# Patient Record
Sex: MALE | Race: WHITE | Employment: UNEMPLOYED | URBAN - METROPOLITAN AREA
[De-identification: names, ages, dates, MRNs, and addresses within clinical notes are randomized per-mention and may not be internally consistent; named-entity substitution may affect disease eponyms.]

---

## 2021-11-20 ENCOUNTER — APPOINTMENT (OUTPATIENT)
Dept: CT IMAGING | Age: 56
DRG: 390 | End: 2021-11-20
Payer: OTHER GOVERNMENT

## 2021-11-20 ENCOUNTER — APPOINTMENT (OUTPATIENT)
Dept: GENERAL RADIOLOGY | Age: 56
DRG: 390 | End: 2021-11-20
Payer: OTHER GOVERNMENT

## 2021-11-20 ENCOUNTER — HOSPITAL ENCOUNTER (INPATIENT)
Age: 56
LOS: 3 days | Discharge: HOME | DRG: 390 | End: 2021-11-23
Attending: EMERGENCY MEDICINE | Admitting: SURGERY
Payer: OTHER GOVERNMENT

## 2021-11-20 DIAGNOSIS — K56.609 SMALL BOWEL OBSTRUCTION (HCC): Primary | ICD-10-CM

## 2021-11-20 DIAGNOSIS — K59.00 CONSTIPATION, UNSPECIFIED CONSTIPATION TYPE: ICD-10-CM

## 2021-11-20 LAB
ALBUMIN SERPL-MCNC: 3.9 G/DL (ref 3.5–5.2)
ALP BLD-CCNC: 61 U/L (ref 40–129)
ALT SERPL-CCNC: 151 U/L (ref 0–40)
ANION GAP SERPL CALCULATED.3IONS-SCNC: 15 MMOL/L (ref 7–16)
AST SERPL-CCNC: 137 U/L (ref 0–39)
BACTERIA: ABNORMAL /HPF
BASOPHILS ABSOLUTE: 0.03 E9/L (ref 0–0.2)
BASOPHILS RELATIVE PERCENT: 0.4 % (ref 0–2)
BILIRUB SERPL-MCNC: 1.9 MG/DL (ref 0–1.2)
BILIRUBIN URINE: ABNORMAL
BLOOD, URINE: NEGATIVE
BUN BLDV-MCNC: 10 MG/DL (ref 6–20)
CALCIUM SERPL-MCNC: 9.7 MG/DL (ref 8.6–10.2)
CHLORIDE BLD-SCNC: 94 MMOL/L (ref 98–107)
CLARITY: CLEAR
CO2: 23 MMOL/L (ref 22–29)
COLOR: ABNORMAL
CREAT SERPL-MCNC: 0.6 MG/DL (ref 0.7–1.2)
EOSINOPHILS ABSOLUTE: 0.03 E9/L (ref 0.05–0.5)
EOSINOPHILS RELATIVE PERCENT: 0.4 % (ref 0–6)
EPITHELIAL CELLS, UA: ABNORMAL /HPF
GFR AFRICAN AMERICAN: >60
GFR NON-AFRICAN AMERICAN: >60 ML/MIN/1.73
GLUCOSE BLD-MCNC: 247 MG/DL (ref 74–99)
GLUCOSE URINE: 250 MG/DL
HCT VFR BLD CALC: 50.2 % (ref 37–54)
HEMOGLOBIN: 18.1 G/DL (ref 12.5–16.5)
HYALINE CASTS: ABNORMAL /LPF (ref 0–2)
IMMATURE GRANULOCYTES #: 0.05 E9/L
IMMATURE GRANULOCYTES %: 0.7 % (ref 0–5)
KETONES, URINE: 40 MG/DL
LACTIC ACID: 2.3 MMOL/L (ref 0.5–2.2)
LEUKOCYTE ESTERASE, URINE: NEGATIVE
LIPASE: 166 U/L (ref 13–60)
LYMPHOCYTES ABSOLUTE: 1.11 E9/L (ref 1.5–4)
LYMPHOCYTES RELATIVE PERCENT: 15.7 % (ref 20–42)
MCH RBC QN AUTO: 32.6 PG (ref 26–35)
MCHC RBC AUTO-ENTMCNC: 36.1 % (ref 32–34.5)
MCV RBC AUTO: 90.3 FL (ref 80–99.9)
METER GLUCOSE: 197 MG/DL (ref 74–99)
MONOCYTES ABSOLUTE: 0.75 E9/L (ref 0.1–0.95)
MONOCYTES RELATIVE PERCENT: 10.6 % (ref 2–12)
MUCUS: PRESENT /LPF
NEUTROPHILS ABSOLUTE: 5.08 E9/L (ref 1.8–7.3)
NEUTROPHILS RELATIVE PERCENT: 72.2 % (ref 43–80)
NITRITE, URINE: POSITIVE
PDW BLD-RTO: 11.8 FL (ref 11.5–15)
PH UA: 5.5 (ref 5–9)
PLATELET # BLD: 276 E9/L (ref 130–450)
PMV BLD AUTO: 10.4 FL (ref 7–12)
POTASSIUM SERPL-SCNC: 4.3 MMOL/L (ref 3.5–5)
POTASSIUM SERPL-SCNC: 6.6 MMOL/L (ref 3.5–5)
PROTEIN UA: 100 MG/DL
RBC # BLD: 5.56 E12/L (ref 3.8–5.8)
RBC UA: ABNORMAL /HPF (ref 0–2)
SODIUM BLD-SCNC: 132 MMOL/L (ref 132–146)
SPECIFIC GRAVITY UA: >=1.03 (ref 1–1.03)
TOTAL PROTEIN: 8.1 G/DL (ref 6.4–8.3)
UROBILINOGEN, URINE: 4 E.U./DL
WBC # BLD: 7.1 E9/L (ref 4.5–11.5)
WBC UA: ABNORMAL /HPF (ref 0–5)

## 2021-11-20 PROCEDURE — 96375 TX/PRO/DX INJ NEW DRUG ADDON: CPT

## 2021-11-20 PROCEDURE — 2580000003 HC RX 258: Performed by: SURGERY

## 2021-11-20 PROCEDURE — 84132 ASSAY OF SERUM POTASSIUM: CPT

## 2021-11-20 PROCEDURE — 2500000003 HC RX 250 WO HCPCS: Performed by: SURGERY

## 2021-11-20 PROCEDURE — 82962 GLUCOSE BLOOD TEST: CPT

## 2021-11-20 PROCEDURE — 96361 HYDRATE IV INFUSION ADD-ON: CPT

## 2021-11-20 PROCEDURE — 6370000000 HC RX 637 (ALT 250 FOR IP): Performed by: STUDENT IN AN ORGANIZED HEALTH CARE EDUCATION/TRAINING PROGRAM

## 2021-11-20 PROCEDURE — 6360000002 HC RX W HCPCS: Performed by: SURGERY

## 2021-11-20 PROCEDURE — 6360000004 HC RX CONTRAST MEDICATION: Performed by: RADIOLOGY

## 2021-11-20 PROCEDURE — 81001 URINALYSIS AUTO W/SCOPE: CPT

## 2021-11-20 PROCEDURE — 99283 EMERGENCY DEPT VISIT LOW MDM: CPT

## 2021-11-20 PROCEDURE — 6360000002 HC RX W HCPCS: Performed by: STUDENT IN AN ORGANIZED HEALTH CARE EDUCATION/TRAINING PROGRAM

## 2021-11-20 PROCEDURE — 80053 COMPREHEN METABOLIC PANEL: CPT

## 2021-11-20 PROCEDURE — 74018 RADEX ABDOMEN 1 VIEW: CPT

## 2021-11-20 PROCEDURE — 83690 ASSAY OF LIPASE: CPT

## 2021-11-20 PROCEDURE — 83605 ASSAY OF LACTIC ACID: CPT

## 2021-11-20 PROCEDURE — 6370000000 HC RX 637 (ALT 250 FOR IP): Performed by: SURGERY

## 2021-11-20 PROCEDURE — 96374 THER/PROPH/DIAG INJ IV PUSH: CPT

## 2021-11-20 PROCEDURE — 2580000003 HC RX 258: Performed by: STUDENT IN AN ORGANIZED HEALTH CARE EDUCATION/TRAINING PROGRAM

## 2021-11-20 PROCEDURE — 74177 CT ABD & PELVIS W/CONTRAST: CPT

## 2021-11-20 PROCEDURE — 1200000000 HC SEMI PRIVATE

## 2021-11-20 PROCEDURE — 6360000002 HC RX W HCPCS

## 2021-11-20 PROCEDURE — 85025 COMPLETE CBC W/AUTO DIFF WBC: CPT

## 2021-11-20 RX ORDER — MORPHINE SULFATE 2 MG/ML
INJECTION, SOLUTION INTRAMUSCULAR; INTRAVENOUS
Status: COMPLETED
Start: 2021-11-20 | End: 2021-11-20

## 2021-11-20 RX ORDER — MORPHINE SULFATE 4 MG/ML
4 INJECTION, SOLUTION INTRAMUSCULAR; INTRAVENOUS ONCE
Status: DISCONTINUED | OUTPATIENT
Start: 2021-11-20 | End: 2021-11-23 | Stop reason: HOSPADM

## 2021-11-20 RX ORDER — DEXTROSE MONOHYDRATE 50 MG/ML
100 INJECTION, SOLUTION INTRAVENOUS PRN
Status: DISCONTINUED | OUTPATIENT
Start: 2021-11-20 | End: 2021-11-23 | Stop reason: HOSPADM

## 2021-11-20 RX ORDER — LISINOPRIL AND HYDROCHLOROTHIAZIDE 12.5; 1 MG/1; MG/1
1 TABLET ORAL DAILY
COMMUNITY

## 2021-11-20 RX ORDER — LOVASTATIN 20 MG/1
20 TABLET ORAL NIGHTLY
COMMUNITY

## 2021-11-20 RX ORDER — GLIPIZIDE 10 MG/1
10 TABLET ORAL DAILY
COMMUNITY

## 2021-11-20 RX ORDER — NICOTINE POLACRILEX 4 MG
15 LOZENGE BUCCAL PRN
Status: DISCONTINUED | OUTPATIENT
Start: 2021-11-20 | End: 2021-11-23 | Stop reason: HOSPADM

## 2021-11-20 RX ORDER — 0.9 % SODIUM CHLORIDE 0.9 %
1000 INTRAVENOUS SOLUTION INTRAVENOUS ONCE
Status: COMPLETED | OUTPATIENT
Start: 2021-11-20 | End: 2021-11-20

## 2021-11-20 RX ORDER — SODIUM CHLORIDE, SODIUM LACTATE, POTASSIUM CHLORIDE, CALCIUM CHLORIDE 600; 310; 30; 20 MG/100ML; MG/100ML; MG/100ML; MG/100ML
INJECTION, SOLUTION INTRAVENOUS CONTINUOUS
Status: DISCONTINUED | OUTPATIENT
Start: 2021-11-20 | End: 2021-11-23

## 2021-11-20 RX ORDER — DEXTROSE MONOHYDRATE 25 G/50ML
12.5 INJECTION, SOLUTION INTRAVENOUS PRN
Status: DISCONTINUED | OUTPATIENT
Start: 2021-11-20 | End: 2021-11-23 | Stop reason: HOSPADM

## 2021-11-20 RX ORDER — SODIUM CHLORIDE 9 MG/ML
25 INJECTION, SOLUTION INTRAVENOUS PRN
Status: DISCONTINUED | OUTPATIENT
Start: 2021-11-20 | End: 2021-11-23 | Stop reason: HOSPADM

## 2021-11-20 RX ORDER — ONDANSETRON 2 MG/ML
4 INJECTION INTRAMUSCULAR; INTRAVENOUS ONCE
Status: COMPLETED | OUTPATIENT
Start: 2021-11-20 | End: 2021-11-20

## 2021-11-20 RX ORDER — LIDOCAINE HYDROCHLORIDE 20 MG/ML
JELLY TOPICAL ONCE
Status: COMPLETED | OUTPATIENT
Start: 2021-11-20 | End: 2021-11-20

## 2021-11-20 RX ORDER — ONDANSETRON 2 MG/ML
4 INJECTION INTRAMUSCULAR; INTRAVENOUS EVERY 6 HOURS PRN
Status: DISCONTINUED | OUTPATIENT
Start: 2021-11-20 | End: 2021-11-23 | Stop reason: HOSPADM

## 2021-11-20 RX ORDER — SODIUM CHLORIDE 0.9 % (FLUSH) 0.9 %
5-40 SYRINGE (ML) INJECTION EVERY 12 HOURS SCHEDULED
Status: DISCONTINUED | OUTPATIENT
Start: 2021-11-20 | End: 2021-11-23 | Stop reason: HOSPADM

## 2021-11-20 RX ORDER — SODIUM CHLORIDE 0.9 % (FLUSH) 0.9 %
5-40 SYRINGE (ML) INJECTION PRN
Status: DISCONTINUED | OUTPATIENT
Start: 2021-11-20 | End: 2021-11-23 | Stop reason: HOSPADM

## 2021-11-20 RX ORDER — ASPIRIN 81 MG/1
81 TABLET, CHEWABLE ORAL DAILY
COMMUNITY

## 2021-11-20 RX ORDER — OXYMETAZOLINE HYDROCHLORIDE 0.05 G/100ML
2 SPRAY NASAL ONCE
Status: DISCONTINUED | OUTPATIENT
Start: 2021-11-20 | End: 2021-11-23 | Stop reason: HOSPADM

## 2021-11-20 RX ORDER — CALCIUM GLUCONATE 94 MG/ML
1000 INJECTION, SOLUTION INTRAVENOUS ONCE
Status: COMPLETED | OUTPATIENT
Start: 2021-11-20 | End: 2021-11-20

## 2021-11-20 RX ADMIN — CALCIUM GLUCONATE 1000 MG: 98 INJECTION, SOLUTION INTRAVENOUS at 16:08

## 2021-11-20 RX ADMIN — SODIUM CHLORIDE 1000 ML: 9 INJECTION, SOLUTION INTRAVENOUS at 17:00

## 2021-11-20 RX ADMIN — SODIUM CHLORIDE 1000 ML: 9 INJECTION, SOLUTION INTRAVENOUS at 15:35

## 2021-11-20 RX ADMIN — ONDANSETRON 4 MG: 2 INJECTION INTRAMUSCULAR; INTRAVENOUS at 15:35

## 2021-11-20 RX ADMIN — INSULIN LISPRO 1 UNITS: 100 INJECTION, SOLUTION INTRAVENOUS; SUBCUTANEOUS at 21:40

## 2021-11-20 RX ADMIN — SODIUM CHLORIDE, POTASSIUM CHLORIDE, SODIUM LACTATE AND CALCIUM CHLORIDE: 600; 310; 30; 20 INJECTION, SOLUTION INTRAVENOUS at 21:17

## 2021-11-20 RX ADMIN — HYDROMORPHONE HYDROCHLORIDE 0.5 MG: 1 INJECTION, SOLUTION INTRAMUSCULAR; INTRAVENOUS; SUBCUTANEOUS at 21:44

## 2021-11-20 RX ADMIN — FAMOTIDINE 20 MG: 10 INJECTION, SOLUTION INTRAVENOUS at 21:25

## 2021-11-20 RX ADMIN — MORPHINE SULFATE 4 MG: 2 INJECTION, SOLUTION INTRAMUSCULAR; INTRAVENOUS at 15:35

## 2021-11-20 RX ADMIN — HYDROMORPHONE HYDROCHLORIDE 0.5 MG: 1 INJECTION, SOLUTION INTRAMUSCULAR; INTRAVENOUS; SUBCUTANEOUS at 18:48

## 2021-11-20 RX ADMIN — SODIUM CHLORIDE, PRESERVATIVE FREE 10 ML: 5 INJECTION INTRAVENOUS at 21:30

## 2021-11-20 RX ADMIN — IOPAMIDOL 75 ML: 755 INJECTION, SOLUTION INTRAVENOUS at 17:45

## 2021-11-20 RX ADMIN — LIDOCAINE HYDROCHLORIDE: 20 JELLY TOPICAL at 18:49

## 2021-11-20 ASSESSMENT — PAIN DESCRIPTION - FREQUENCY: FREQUENCY: CONTINUOUS

## 2021-11-20 ASSESSMENT — PAIN - FUNCTIONAL ASSESSMENT: PAIN_FUNCTIONAL_ASSESSMENT: PREVENTS OR INTERFERES SOME ACTIVE ACTIVITIES AND ADLS

## 2021-11-20 ASSESSMENT — PAIN DESCRIPTION - ORIENTATION
ORIENTATION: MID
ORIENTATION: MID

## 2021-11-20 ASSESSMENT — PAIN DESCRIPTION - DESCRIPTORS: DESCRIPTORS: SHARP

## 2021-11-20 ASSESSMENT — ENCOUNTER SYMPTOMS
VOMITING: 1
EYE DISCHARGE: 0
CONSTIPATION: 1
BACK PAIN: 0
ABDOMINAL PAIN: 1
EYE PAIN: 0
NAUSEA: 1
DIARRHEA: 0
SINUS PRESSURE: 0
EYE REDNESS: 0
COUGH: 0
SHORTNESS OF BREATH: 0
WHEEZING: 0
SORE THROAT: 0

## 2021-11-20 ASSESSMENT — PAIN DESCRIPTION - ONSET: ONSET: SUDDEN

## 2021-11-20 ASSESSMENT — PAIN DESCRIPTION - LOCATION
LOCATION: ABDOMEN
LOCATION: ABDOMEN

## 2021-11-20 ASSESSMENT — PAIN SCALES - GENERAL
PAINLEVEL_OUTOF10: 7
PAINLEVEL_OUTOF10: 8
PAINLEVEL_OUTOF10: 9
PAINLEVEL_OUTOF10: 10
PAINLEVEL_OUTOF10: 5

## 2021-11-20 ASSESSMENT — PAIN DESCRIPTION - PAIN TYPE
TYPE: ACUTE PAIN
TYPE: ACUTE PAIN

## 2021-11-20 NOTE — ED PROVIDER NOTES
Chief Complaint   Patient presents with    Abdominal Pain     mid onset yesterday, reports hx of multiple hernia repairs in that location Pain radiates straight thru to back    Constipation     last normal BM 2 days, able to pass gas       Patient is a 79-year-old male presents today for generalized abdominal pain and constipation. Has not had a bowel movement past 2 days. Does have history of multiple abdominal surgeries due to abdominal hernias with mesh placed. [place]. Has associated nausea with an episode of vomiting today. Denies fevers or chills. Denies lightheadedness or dizziness. Is not tried a medication for the symptoms. Symptoms not made better or worse by anything in specific. Denies urinary symptoms. Denies fevers or chills. Pain is generalized to the abdomen, not worse in 1 specific spot. The history is provided by the patient. No  was used. Review of Systems   Constitutional: Negative for chills, diaphoresis and fever. HENT: Negative for ear pain, sinus pressure and sore throat. Eyes: Negative for pain, discharge and redness. Respiratory: Negative for cough, shortness of breath and wheezing. Cardiovascular: Negative for chest pain. Gastrointestinal: Positive for abdominal pain, constipation, nausea and vomiting. Negative for diarrhea. Genitourinary: Negative for difficulty urinating, dysuria, flank pain and frequency. Musculoskeletal: Negative for arthralgias and back pain. Skin: Negative for rash and wound. Neurological: Negative for dizziness, syncope, weakness, numbness and headaches. Hematological: Negative for adenopathy. Psychiatric/Behavioral: Negative for behavioral problems and confusion. All other systems reviewed and are negative. Physical Exam  Vitals and nursing note reviewed. Constitutional:       General: He is not in acute distress. Appearance: He is well-developed.  He is obese. He is not ill-appearing. HENT:      Head: Normocephalic and atraumatic. Eyes:      Pupils: Pupils are equal, round, and reactive to light. Cardiovascular:      Rate and Rhythm: Normal rate and regular rhythm. Pulses: Normal pulses. Heart sounds: Normal heart sounds. No murmur heard. Pulmonary:      Effort: Pulmonary effort is normal. No respiratory distress. Breath sounds: Normal breath sounds. No wheezing or rales. Abdominal:      General: Bowel sounds are normal.      Palpations: Abdomen is soft. Tenderness: There is abdominal tenderness. There is no guarding or rebound. Comments: Generalized tenderness throughout, no guarding rebound  Multiple abdominal scars from previous surgeries   Musculoskeletal:      Cervical back: Normal range of motion and neck supple. Right lower leg: No edema. Left lower leg: No edema. Skin:     General: Skin is warm and dry. Neurological:      General: No focal deficit present. Mental Status: He is alert and oriented to person, place, and time. Cranial Nerves: No cranial nerve deficit.       Coordination: Coordination normal.          Procedures     Labs Reviewed   CBC WITH AUTO DIFFERENTIAL - Abnormal; Notable for the following components:       Result Value    Hemoglobin 18.1 (*)     MCHC 36.1 (*)     Lymphocytes % 15.7 (*)     Lymphocytes Absolute 1.11 (*)     Eosinophils Absolute 0.03 (*)     All other components within normal limits   COMPREHENSIVE METABOLIC PANEL - Abnormal; Notable for the following components:    Potassium 6.6 (*)     Chloride 94 (*)     Glucose 247 (*)     CREATININE 0.6 (*)     Total Bilirubin 1.9 (*)      (*)      (*)     All other components within normal limits    Narrative:     CALL  [facility] tel. [phone],  Chemistry results called to and read back by/agus knight rn, 11/20/2021  15:57, by TACOS   LIPASE - Abnormal; Notable for the following components:    Lipase 166 (*)     All other components within normal limits    Narrative:     [facility] tel. [phone],  Chemistry results called to and read back by/agus knight rn, 11/20/2021  15:57, by TACOS   LACTIC ACID, PLASMA - Abnormal; Notable for the following components:    Lactic Acid 2.3 (*)     All other components within normal limits   URINALYSIS WITH MICROSCOPIC - Abnormal; Notable for the following components:    Color, UA DARK YELLOW (*)     Glucose, Ur 250 (*)     Bilirubin Urine MODERATE (*)     Ketones, Urine 40 (*)     Protein,  (*)     Urobilinogen, Urine 4.0 (*)     Nitrite, Urine POSITIVE (*)     Mucus, UA Present (*)     WBC, UA 5-10 (*)     Bacteria, UA FEW (*)     All other components within normal limits   POTASSIUM   BASIC METABOLIC PANEL W/ REFLEX TO MG FOR LOW K   HEPATIC FUNCTION PANEL   LACTIC ACID, PLASMA   MAGNESIUM   PHOSPHORUS   LIPASE   CBC WITH AUTO DIFFERENTIAL   PROTIME-INR   HEMOGLOBIN A1C   POCT GLUCOSE   POCT GLUCOSE     CT ABDOMEN PELVIS W IV CONTRAST Additional Contrast? None   Final Result   There are mildly dilated and fluid-filled loops of proximal and mid small   bowel with multiple air-fluid levels, suspicious for early or partial small   bowel obstruction. Diffuse fatty liver. XR ABDOMEN FOR NG/OG/NE TUBE PLACEMENT    (Results Pending)         MDM  Number of Diagnoses or Management Options  Diagnosis management comments: Patient is a 57-year-old male with many abdominal surgeries due to hernia with mesh who presents today for abdominal pain, nausea, vomiting constipation. There was concern for bowel obstruction, labs and imaging were obtained. He was given pain medication. CT imaging shows dilated and fluid-filled loops of proximal and mid small bowel with multiple air-fluid levels consistent with small bowel obstruction.   Initial potassium was hemolyzed, elevated at 6.6, at that time he was given calcium gluconate, repeat potassium does show improvement of 4.4.  Lactic mildly elevated at 2.3. Remainder of labs within normal limits otherwise. General surgery was consulted, they recommended n.p.o. and NG tube to be placed. Patient will be admitted to the hospital for bowel obstruction. He is remained hemodynamically stable. Amount and/or Complexity of Data Reviewed  Clinical lab tests: reviewed  Tests in the radiology section of CPT®: reviewed             ED Course as of 11/20/21 1925   Sat Nov 20, 2021   1559 Potassium 6.6, moderately hemolyzed. 1 g calcium ordered, repeat potassium ordered, patient will be placed on cardiac monitor. Nurse was updated []   1650 Repeat potassium 4.3, initial of 6.6 was hemolyzed. []   36 Spoke with general surgery who will admit []      ED Course User Index  [] Tori Tena, DO       --------------------------------------------- PAST HISTORY ---------------------------------------------  Past Medical History:  has a past medical history of Controlled type 2 diabetes mellitus without complication, without long-term current use of insulin ([facility] [address] and Primary hypertension. Past Surgical History:  has no past surgical history on file. Social History:  reports that he has never smoked. He has never used smokeless tobacco. He reports previous alcohol use. He reports previous drug use. Family History: family history is not on file. The patients home medications have been reviewed. Allergies: Patient has no known allergies.     -------------------------------------------------- RESULTS -------------------------------------------------    LABS:  Results for orders placed or performed during the hospital encounter of 11/20/21   CBC auto differential   Result Value Ref Range    WBC 7.1 4.5 - 11.5 E9/L    RBC 5.56 3.80 - 5.80 E12/L    Hemoglobin 18.1 (H) 12.5 - 16.5 g/dL    Hematocrit 50.2 37.0 - 54.0 %    MCV 90.3 80.0 - 99.9 fL    MCH 32.6 26.0 - 35.0 pg    MCHC 36.1 (H) 32.0 - 34.5 %    RDW 11.8 11.5 - 15.0 fL Platelets 965 044 - 501 E9/L    MPV 10.4 7.0 - 12.0 fL    Neutrophils % 72.2 43.0 - 80.0 %    Immature Granulocytes % 0.7 0.0 - 5.0 %    Lymphocytes % 15.7 (L) 20.0 - 42.0 %    Monocytes % 10.6 2.0 - 12.0 %    Eosinophils % 0.4 0.0 - 6.0 %    Basophils % 0.4 0.0 - 2.0 %    Neutrophils Absolute 5.08 1.80 - 7.30 E9/L    Immature Granulocytes # 0.05 E9/L    Lymphocytes Absolute 1.11 (L) 1.50 - 4.00 E9/L    Monocytes Absolute 0.75 0.10 - 0.95 E9/L    Eosinophils Absolute 0.03 (L) 0.05 - 0.50 E9/L    Basophils Absolute 0.03 0.00 - 0.20 E9/L   Comprehensive Metabolic Panel   Result Value Ref Range    Sodium 132 132 - 146 mmol/L    Potassium 6.6 (HH) 3.5 - 5.0 mmol/L    Chloride 94 (L) 98 - 107 mmol/L    CO2 23 22 - 29 mmol/L    Anion Gap 15 7 - 16 mmol/L    Glucose 247 (H) 74 - 99 mg/dL    BUN 10 6 - 20 mg/dL    CREATININE 0.6 (L) 0.7 - 1.2 mg/dL    GFR Non-African American >60 >=60 mL/min/1.73    GFR African American >60     Calcium 9.7 8.6 - 10.2 mg/dL    Total Protein 8.1 6.4 - 8.3 g/dL    Albumin 3.9 3.5 - 5.2 g/dL    Total Bilirubin 1.9 (H) 0.0 - 1.2 mg/dL    Alkaline Phosphatase 61 40 - 129 U/L     (H) 0 - 40 U/L     (H) 0 - 39 U/L   Lipase   Result Value Ref Range    Lipase 166 (H) 13 - 60 U/L   Lactic Acid, Plasma   Result Value Ref Range    Lactic Acid 2.3 (H) 0.5 - 2.2 mmol/L   Urinalysis with Microscopic   Result Value Ref Range    Color, UA DARK YELLOW (A) Straw/Yellow    Clarity, UA Clear Clear    Glucose, Ur 250 (A) Negative mg/dL    Bilirubin Urine MODERATE (A) Negative    Ketones, Urine 40 (A) Negative mg/dL    Specific Gravity, UA >=1.030 1.005 - 1.030    Blood, Urine Negative Negative    pH, UA 5.5 5.0 - 9.0    Protein,  (A) Negative mg/dL    Urobilinogen, Urine 4.0 (A) <2.0 E.U./dL    Nitrite, Urine POSITIVE (A) Negative    Leukocyte Esterase, Urine Negative Negative    Hyaline Casts, UA 0-2 0 - 2 /LPF    Mucus, UA Present (A) None Seen /LPF    WBC, UA 5-10 (A) 0 - 5 /HPF    RBC, UA 2-5 0 - 2 /HPF    Epithelial Cells, UA FEW /HPF    Bacteria, UA FEW (A) None Seen /HPF   Potassium   Result Value Ref Range    Potassium 4.3 3.5 - 5.0 mmol/L       RADIOLOGY:  CT ABDOMEN PELVIS W IV CONTRAST Additional Contrast? None   Final Result   There are mildly dilated and fluid-filled loops of proximal and mid small   bowel with multiple air-fluid levels, suspicious for early or partial small   bowel obstruction. Diffuse fatty liver. XR ABDOMEN FOR NG/OG/NE TUBE PLACEMENT    (Results Pending)           ------------------------- NURSING NOTES AND VITALS REVIEWED ---------------------------  Date / Time Roomed:  11/20/2021  1:50 PM  ED Bed Assignment:  18/18    The nursing notes within the ED encounter and vital signs as below have been reviewed. Patient Vitals for the past 24 hrs:   BP Temp Temp src Pulse Resp SpO2 Height Weight   11/20/21 1906 (!) 128/94 -- -- 94 16 96 % -- --   11/20/21 1347 (!) 136/100 97.8 °F (36.6 °C) Temporal 104 16 94 % 6' (1.829 m) 248 lb (112.5 kg)       Oxygen Saturation Interpretation: Normal    ------------------------------------------ PROGRESS NOTES ------------------------------------------    Counseling:  I have spoken with the patient and discussed todays results, in addition to providing specific details for the plan of care and counseling regarding the diagnosis and prognosis. Their questions are answered at this time and they are agreeable with the plan of admission.    --------------------------------- ADDITIONAL PROVIDER NOTES ---------------------------------  Consultations:  Spoke with Dr. Catrachito Salas. Discussed case. They will admit the patient. This patient's ED course included: a personal history and physicial examination    This patient has remained hemodynamically stable during their ED course.       Medications   morphine sulfate (PF) injection 4 mg (4 mg IntraVENous Not Given 11/20/21 1543)   oxymetazoline (AFRIN) 0.05 % nasal spray 2 spray (has no administration in time range)   sodium chloride flush 0.9 % injection 5-40 mL (has no administration in time range)   sodium chloride flush 0.9 % injection 5-40 mL (has no administration in time range)   0.9 % sodium chloride infusion (has no administration in time range)   enoxaparin (LOVENOX) injection 40 mg (has no administration in time range)   lactated ringers infusion (has no administration in time range)   HYDROmorphone (DILAUDID) injection 0.25 mg (has no administration in time range)     Or   HYDROmorphone (DILAUDID) injection 0.5 mg (has no administration in time range)   famotidine (PEPCID) injection 20 mg (has no administration in time range)   ondansetron (ZOFRAN) injection 4 mg (has no administration in time range)   glucose (GLUTOSE) 40 % oral gel 15 g (has no administration in time range)   dextrose 50 % IV solution (has no administration in time range)   glucagon (rDNA) injection 1 mg (has no administration in time range)   dextrose 5 % solution (has no administration in time range)   insulin lispro (HUMALOG) injection vial 0-6 Units (has no administration in time range)   ondansetron (ZOFRAN) injection 4 mg (4 mg IntraVENous Given 11/20/21 1535)   0.9 % sodium chloride bolus (0 mLs IntraVENous Stopped 11/20/21 1908)   morphine 2 MG/ML injection (4 mg  Given 11/20/21 1535)   calcium gluconate 10 % injection 1,000 mg (1,000 mg IntraVENous Given 11/20/21 1608)   0.9 % sodium chloride bolus (0 mLs IntraVENous Stopped 11/20/21 1908)   iopamidol (ISOVUE-370) 76 % injection 75 mL (75 mLs IntraVENous Given 11/20/21 1745)   HYDROmorphone (DILAUDID) injection 0.5 mg (0.5 mg IntraVENous Given 11/20/21 1848)   lidocaine (XYLOCAINE) 2 % jelly ( Topical Given 11/20/21 1849)         Diagnosis:  1. Small bowel obstruction ([address]    2. Constipation, unspecified constipation type        Disposition:  Patient's disposition: Admit to med/surg floor  Patient's condition is stable.              Sherry Hicks DO  Resident  11/20/21 1925

## 2021-11-21 LAB
ALBUMIN SERPL-MCNC: 3.7 G/DL (ref 3.5–5.2)
ALP BLD-CCNC: 59 U/L (ref 40–129)
ALT SERPL-CCNC: 120 U/L (ref 0–40)
ANION GAP SERPL CALCULATED.3IONS-SCNC: 12 MMOL/L (ref 7–16)
AST SERPL-CCNC: 85 U/L (ref 0–39)
BASOPHILS ABSOLUTE: 0.03 E9/L (ref 0–0.2)
BASOPHILS RELATIVE PERCENT: 0.5 % (ref 0–2)
BILIRUB SERPL-MCNC: 1.5 MG/DL (ref 0–1.2)
BILIRUBIN DIRECT: 0.7 MG/DL (ref 0–0.3)
BILIRUBIN, INDIRECT: 0.8 MG/DL (ref 0–1)
BUN BLDV-MCNC: 10 MG/DL (ref 6–20)
CALCIUM SERPL-MCNC: 9 MG/DL (ref 8.6–10.2)
CHLORIDE BLD-SCNC: 99 MMOL/L (ref 98–107)
CO2: 26 MMOL/L (ref 22–29)
CREAT SERPL-MCNC: 0.7 MG/DL (ref 0.7–1.2)
EOSINOPHILS ABSOLUTE: 0.07 E9/L (ref 0.05–0.5)
EOSINOPHILS RELATIVE PERCENT: 1.1 % (ref 0–6)
GFR AFRICAN AMERICAN: >60
GFR NON-AFRICAN AMERICAN: >60 ML/MIN/1.73
GLUCOSE BLD-MCNC: 233 MG/DL (ref 74–99)
HBA1C MFR BLD: 11 % (ref 4–5.6)
HCT VFR BLD CALC: 47.5 % (ref 37–54)
HEMOGLOBIN: 16.4 G/DL (ref 12.5–16.5)
IMMATURE GRANULOCYTES #: 0.04 E9/L
IMMATURE GRANULOCYTES %: 0.6 % (ref 0–5)
INR BLD: 1
LACTIC ACID: 1.4 MMOL/L (ref 0.5–2.2)
LIPASE: 85 U/L (ref 13–60)
LYMPHOCYTES ABSOLUTE: 1.13 E9/L (ref 1.5–4)
LYMPHOCYTES RELATIVE PERCENT: 17.3 % (ref 20–42)
MAGNESIUM: 2.2 MG/DL (ref 1.6–2.6)
MCH RBC QN AUTO: 31.7 PG (ref 26–35)
MCHC RBC AUTO-ENTMCNC: 34.5 % (ref 32–34.5)
MCV RBC AUTO: 91.9 FL (ref 80–99.9)
METER GLUCOSE: 130 MG/DL (ref 74–99)
METER GLUCOSE: 149 MG/DL (ref 74–99)
METER GLUCOSE: 185 MG/DL (ref 74–99)
METER GLUCOSE: 189 MG/DL (ref 74–99)
METER GLUCOSE: 192 MG/DL (ref 74–99)
METER GLUCOSE: 198 MG/DL (ref 74–99)
MONOCYTES ABSOLUTE: 0.64 E9/L (ref 0.1–0.95)
MONOCYTES RELATIVE PERCENT: 9.8 % (ref 2–12)
NEUTROPHILS ABSOLUTE: 4.64 E9/L (ref 1.8–7.3)
NEUTROPHILS RELATIVE PERCENT: 70.7 % (ref 43–80)
PDW BLD-RTO: 11.9 FL (ref 11.5–15)
PHOSPHORUS: 3.1 MG/DL (ref 2.5–4.5)
PLATELET # BLD: 281 E9/L (ref 130–450)
PMV BLD AUTO: 10.3 FL (ref 7–12)
POTASSIUM REFLEX MAGNESIUM: 4.5 MMOL/L (ref 3.5–5)
PROTHROMBIN TIME: 11.1 SEC (ref 9.3–12.4)
RBC # BLD: 5.17 E12/L (ref 3.8–5.8)
REASON FOR REJECTION: NORMAL
REJECTED TEST: NORMAL
SODIUM BLD-SCNC: 137 MMOL/L (ref 132–146)
TOTAL PROTEIN: 7.1 G/DL (ref 6.4–8.3)
WBC # BLD: 6.6 E9/L (ref 4.5–11.5)

## 2021-11-21 PROCEDURE — 2500000003 HC RX 250 WO HCPCS: Performed by: SURGERY

## 2021-11-21 PROCEDURE — 83605 ASSAY OF LACTIC ACID: CPT

## 2021-11-21 PROCEDURE — 6370000000 HC RX 637 (ALT 250 FOR IP): Performed by: SURGERY

## 2021-11-21 PROCEDURE — 36415 COLL VENOUS BLD VENIPUNCTURE: CPT

## 2021-11-21 PROCEDURE — 84100 ASSAY OF PHOSPHORUS: CPT

## 2021-11-21 PROCEDURE — 80048 BASIC METABOLIC PNL TOTAL CA: CPT

## 2021-11-21 PROCEDURE — 85610 PROTHROMBIN TIME: CPT

## 2021-11-21 PROCEDURE — 99222 1ST HOSP IP/OBS MODERATE 55: CPT | Performed by: SURGERY

## 2021-11-21 PROCEDURE — 85025 COMPLETE CBC W/AUTO DIFF WBC: CPT

## 2021-11-21 PROCEDURE — 83735 ASSAY OF MAGNESIUM: CPT

## 2021-11-21 PROCEDURE — 82962 GLUCOSE BLOOD TEST: CPT

## 2021-11-21 PROCEDURE — 83036 HEMOGLOBIN GLYCOSYLATED A1C: CPT

## 2021-11-21 PROCEDURE — 6360000002 HC RX W HCPCS: Performed by: SURGERY

## 2021-11-21 PROCEDURE — 1200000000 HC SEMI PRIVATE

## 2021-11-21 PROCEDURE — 83690 ASSAY OF LIPASE: CPT

## 2021-11-21 PROCEDURE — 2580000003 HC RX 258: Performed by: SURGERY

## 2021-11-21 PROCEDURE — 80076 HEPATIC FUNCTION PANEL: CPT

## 2021-11-21 RX ORDER — HYDRALAZINE HYDROCHLORIDE 20 MG/ML
10 INJECTION INTRAMUSCULAR; INTRAVENOUS EVERY 6 HOURS PRN
Status: DISCONTINUED | OUTPATIENT
Start: 2021-11-21 | End: 2021-11-23 | Stop reason: HOSPADM

## 2021-11-21 RX ADMIN — INSULIN LISPRO 1 UNITS: 100 INJECTION, SOLUTION INTRAVENOUS; SUBCUTANEOUS at 15:08

## 2021-11-21 RX ADMIN — BENZOCAINE, BUTAMBEN, AND TETRACAINE HYDROCHLORIDE 1 SPRAY: .028; .004; .004 AEROSOL, SPRAY TOPICAL at 19:35

## 2021-11-21 RX ADMIN — SODIUM CHLORIDE, POTASSIUM CHLORIDE, SODIUM LACTATE AND CALCIUM CHLORIDE: 600; 310; 30; 20 INJECTION, SOLUTION INTRAVENOUS at 12:04

## 2021-11-21 RX ADMIN — INSULIN LISPRO 1 UNITS: 100 INJECTION, SOLUTION INTRAVENOUS; SUBCUTANEOUS at 03:39

## 2021-11-21 RX ADMIN — FAMOTIDINE 20 MG: 10 INJECTION, SOLUTION INTRAVENOUS at 20:28

## 2021-11-21 RX ADMIN — SODIUM CHLORIDE, POTASSIUM CHLORIDE, SODIUM LACTATE AND CALCIUM CHLORIDE: 600; 310; 30; 20 INJECTION, SOLUTION INTRAVENOUS at 19:36

## 2021-11-21 RX ADMIN — INSULIN LISPRO 1 UNITS: 100 INJECTION, SOLUTION INTRAVENOUS; SUBCUTANEOUS at 06:09

## 2021-11-21 RX ADMIN — HYDROMORPHONE HYDROCHLORIDE 0.5 MG: 1 INJECTION, SOLUTION INTRAMUSCULAR; INTRAVENOUS; SUBCUTANEOUS at 00:51

## 2021-11-21 RX ADMIN — INSULIN LISPRO 1 UNITS: 100 INJECTION, SOLUTION INTRAVENOUS; SUBCUTANEOUS at 11:44

## 2021-11-21 RX ADMIN — SODIUM CHLORIDE, POTASSIUM CHLORIDE, SODIUM LACTATE AND CALCIUM CHLORIDE: 600; 310; 30; 20 INJECTION, SOLUTION INTRAVENOUS at 04:06

## 2021-11-21 RX ADMIN — HYDROMORPHONE HYDROCHLORIDE 0.5 MG: 1 INJECTION, SOLUTION INTRAMUSCULAR; INTRAVENOUS; SUBCUTANEOUS at 04:00

## 2021-11-21 RX ADMIN — FAMOTIDINE 20 MG: 10 INJECTION, SOLUTION INTRAVENOUS at 08:47

## 2021-11-21 RX ADMIN — INSULIN LISPRO 1 UNITS: 100 INJECTION, SOLUTION INTRAVENOUS; SUBCUTANEOUS at 19:12

## 2021-11-21 ASSESSMENT — PAIN SCALES - GENERAL
PAINLEVEL_OUTOF10: 6
PAINLEVEL_OUTOF10: 9
PAINLEVEL_OUTOF10: 3
PAINLEVEL_OUTOF10: 0

## 2021-11-21 NOTE — PLAN OF CARE
Problem: Pain:  Goal: Pain level will decrease  Description: Pain level will decrease  11/21/2021 1227 by Aung Holliday RN  Outcome: Met This Shift  11/20/2021 2337 by Joseph Castro RN  Outcome: Met This Shift  Goal: Control of acute pain  Description: Control of acute pain  11/21/2021 1227 by Aung Holliday RN  Outcome: Met This Shift  11/20/2021 2337 by Joseph Castro RN  Outcome: Met This Shift  Goal: Control of chronic pain  Description: Control of chronic pain  11/21/2021 1227 by Aung Holliday RN  Outcome: Met This Shift  11/20/2021 2337 by Joseph Castro RN  Outcome: Met This Shift

## 2021-11-21 NOTE — ED NOTES
Bed: 18  Expected date:   Expected time:   Means of arrival:   Comments:  [address], RN  11/20/21 1901

## 2021-11-21 NOTE — PROGRESS NOTES
Patient did 2 laps around floor. Tolerated well.   Electronically signed by Lakhwinder Romano RN on 11/21/2021 at 1:23 PM

## 2021-11-21 NOTE — PROGRESS NOTES
Patient refused blood glucose test and insulin.      Electronically signed by Osiel Puri RN on 11/20/2021 at 10:48 PM

## 2021-11-21 NOTE — H&P
Surgery History and Physical/Consult Note    CC:    Obstruction    HPI:  This is a 61 y.o. male with PMH below admitted 11/20/2021 with 2 days of decreased bowel movement and some abdominal pain. He has had his gallbladder out as well as multiple abdominal hernias with mesh in the past.  He has had bowel infections previously. He says he never had an NG tube however they had done what sounds to be follow-through's and they resolved spontaneously. All of his care previously was in [place]. He recently moved here 8 months ago. His pain is improved today. Passing small amount of flatus. Is on the bowel function. NG tube is in place and draining bilious contents. Did have some emesis prior to arrival and some nausea. Those are both better with NG tube decompression. PMH:  Past Medical History:   Diagnosis Date    Controlled type 2 diabetes mellitus without complication, without long-term current use of insulin (HCC)     Primary hypertension        PSH:  Past Surgical History:   Procedure Laterality Date    ABDOMINAL HERNIA REPAIR      With mesh    CHOLECYSTECTOMY         Family History:  No family history on file. Social History:   reports that he has never smoked. He has never used smokeless tobacco. He reports previous alcohol use. He reports previous drug use. Review of Systems:  A 14pt complete review of systems was performed, and all pertinent positives and negatives are listed in the HPI. All other systems are negative. Allergies:  No Known Allergies    Medications:  Home medications:   Prior to Admission medications    Medication Sig Start Date End Date Taking?  Authorizing Provider   glipiZIDE (GLUCOTROL) 10 MG tablet Take 10 mg by mouth daily   Yes Historical Provider, MD   aspirin 81 MG chewable tablet Take 81 mg by mouth daily   Yes Historical Provider, MD   lovastatin (MEVACOR) 20 MG tablet Take 20 mg by mouth nightly   Yes Historical Provider, MD   metFORMIN (GLUCOPHAGE) 1000 MG tablet Take 1,000 mg by mouth 2 times daily (with meals)   Yes Historical Provider, MD   lisinopril-hydroCHLOROthiazide (PRINZIDE;ZESTORETIC) 10-12.5 MG per tablet Take 1 tablet by mouth daily   Yes Historical Provider, MD       Scheduled medications:   morphine  4 mg IntraVENous Once    oxymetazoline  2 spray Each Nostril Once    sodium chloride flush  5-40 mL IntraVENous 2 times per day    enoxaparin  40 mg SubCUTAneous Daily    famotidine (PEPCID) injection  20 mg IntraVENous BID    insulin lispro  0-6 Units SubCUTAneous Q4H       PRN medications: sodium chloride flush, sodium chloride, HYDROmorphone **OR** HYDROmorphone, ondansetron, glucose, dextrose, glucagon (rDNA), dextrose    Objective:    Physical Examination:  Vitals:    11/20/21 1906 11/20/21 2045 11/21/21 0044 11/21/21 0846   BP: (!) 128/94 (!) 142/82  128/77   Pulse: 94 96  74   Resp: 16 16  16   Temp:  97.8 °F (36.6 °C)  97.6 °F (36.4 °C)   TempSrc:  Oral  Oral   SpO2: 96% 90%  94%   Weight:   255 lb (115.7 kg)    Height:           General - alert, well appearing, and in no distress  HEENT - Normocephalic, Atraumatic. No Scleral Icterus  Neck - supple, trachea midline  Respiratory - Breathing comfortably, no respiratory distress  Heart - Regular Rate  Abdomen - soft, minimal mid abdominal tenderness without rebound or guarding, obese  Neurological - alert, oriented x 3  Psych - affect normal  Musculoskeletal - moves all extremities, no gross deficit  Skin - warm, pink    Labs:    CBC  Recent Labs     11/21/21 0423   WBC 6.6   HGB 16.4   HCT 47.5        BMP  Recent Labs     11/21/21 0423      K 4.5   CL 99   CO2 26   BUN 10   CREATININE 0.7   CALCIUM 9.0     Liver Function  Recent Labs     11/21/21 0423   LIPASE 85*   BILITOT 1.5*   BILIDIR 0.7*   AST 85*   *   ALKPHOS 59   PROT 7.1   LABALBU 3.7     No results for input(s): LACTATE in the last 72 hours.   Recent Labs     11/21/21  0541   INR 1.0       Imaging:    CT ABDOMEN PELVIS W IV CONTRAST Additional Contrast? None    Result Date: 11/20/2021  EXAMINATION: CT OF THE ABDOMEN AND PELVIS WITH CONTRAST 11/20/2021 5:45 pm TECHNIQUE: CT of the abdomen and pelvis was performed with the administration of intravenous contrast. Multiplanar reformatted images are provided for review. Dose modulation, iterative reconstruction, and/or weight based adjustment of the mA/kV was utilized to reduce the radiation dose to as low as reasonably achievable. COMPARISON: None. HISTORY: ORDERING SYSTEM PROVIDED HISTORY: pain TECHNOLOGIST PROVIDED HISTORY: Reason for exam:->pain Additional Contrast?->None Decision Support Exception - unselect if not a suspected or confirmed emergency medical condition->Emergency Medical Condition (MA) FINDINGS: Lower Chest: There are multifocal ground-glass patchy opacities in the periphery of the visualized lower lungs. No pleural effusion. Organs: Fatty liver with no focal lesions. Gallbladder is surgically absent. Spleen is not enlarged. Pancreas and adrenal glands appear unremarkable. There is symmetric enhancement of the kidneys with no hydronephrosis or perinephric infiltration. GI/Bowel: Appendix is normal.  There are mildly dilated and fluid-filled loops of proximal and mid small bowel with air-fluid levels. Pelvis: Bladder is suboptimally distended. There is no significant free fluid. Peritoneum/Retroperitoneum: No free air or significant adenopathy. Bones/Soft Tissues: Unremarkable. There are mildly dilated and fluid-filled loops of proximal and mid small bowel with multiple air-fluid levels, suspicious for early or partial small bowel obstruction. Diffuse fatty liver. XR ABDOMEN FOR NG/OG/NE TUBE PLACEMENT    Result Date: 11/20/2021  EXAMINATION: ONE SUPINE XRAY VIEW(S) OF THE ABDOMEN 11/20/2021 7:09 pm COMPARISON: None.  HISTORY: ORDERING SYSTEM PROVIDED HISTORY: Confirmation of course of NG/OG/NE tube and location of tip of tube TECHNOLOGIST PROVIDED HISTORY: Reason for exam:->Confirmation of course of NG/OG/NE tube and location of tip of tube Portable? ->Yes FINDINGS: Nasogastric tube courses below the level of the diaphragm with distal tip in the expected location of the stomach. Satisfactory position of nasogastric tube. ASSESSMENT & PLAN:    I have examined the patient and performed key aspects of physical exam, reviewed the record (including all pertinent and new radiology images and laboratory findings), and discussed the case with the surgical team.  I agree with the assessment and plan with the following additions, corrections, and changes. This is a 61 y.o. male admitted 11/20/2021 with P SBO. Continue NG tube decompression. We will plan for small bowel follow-through tomorrow. Hopefully this will resolve nonoperatively. Denae Doran MD   11/21/2021   9:54 AM    NOTE: This report, in part or full, may have been transcribed using voice recognition software. Every effort was made to ensure accuracy; however, inadvertent computerized transcription errors may be present. Please excuse any transcriptional grammatical or spelling errors that may have escaped my editorial review.

## 2021-11-22 ENCOUNTER — APPOINTMENT (OUTPATIENT)
Dept: GENERAL RADIOLOGY | Age: 56
DRG: 390 | End: 2021-11-22
Payer: OTHER GOVERNMENT

## 2021-11-22 LAB
METER GLUCOSE: 138 MG/DL (ref 74–99)
METER GLUCOSE: 150 MG/DL (ref 74–99)
METER GLUCOSE: 182 MG/DL (ref 74–99)
METER GLUCOSE: 210 MG/DL (ref 74–99)

## 2021-11-22 PROCEDURE — 99232 SBSQ HOSP IP/OBS MODERATE 35: CPT | Performed by: SURGERY

## 2021-11-22 PROCEDURE — 2580000003 HC RX 258: Performed by: SURGERY

## 2021-11-22 PROCEDURE — 74250 X-RAY XM SM INT 1CNTRST STD: CPT

## 2021-11-22 PROCEDURE — 82962 GLUCOSE BLOOD TEST: CPT

## 2021-11-22 PROCEDURE — 6360000004 HC RX CONTRAST MEDICATION: Performed by: SURGERY

## 2021-11-22 PROCEDURE — 2500000003 HC RX 250 WO HCPCS: Performed by: SURGERY

## 2021-11-22 PROCEDURE — 6370000000 HC RX 637 (ALT 250 FOR IP): Performed by: SURGERY

## 2021-11-22 PROCEDURE — 1200000000 HC SEMI PRIVATE

## 2021-11-22 RX ADMIN — SODIUM CHLORIDE, POTASSIUM CHLORIDE, SODIUM LACTATE AND CALCIUM CHLORIDE: 600; 310; 30; 20 INJECTION, SOLUTION INTRAVENOUS at 03:46

## 2021-11-22 RX ADMIN — SODIUM CHLORIDE, PRESERVATIVE FREE 10 ML: 5 INJECTION INTRAVENOUS at 07:44

## 2021-11-22 RX ADMIN — INSULIN LISPRO 1 UNITS: 100 INJECTION, SOLUTION INTRAVENOUS; SUBCUTANEOUS at 16:22

## 2021-11-22 RX ADMIN — DIATRIZOATE MEGLUMINE AND DIATRIZOATE SODIUM 240 ML: 660; 100 LIQUID ORAL; RECTAL at 09:49

## 2021-11-22 RX ADMIN — INSULIN LISPRO 2 UNITS: 100 INJECTION, SOLUTION INTRAVENOUS; SUBCUTANEOUS at 11:28

## 2021-11-22 RX ADMIN — FAMOTIDINE 20 MG: 10 INJECTION, SOLUTION INTRAVENOUS at 07:44

## 2021-11-22 NOTE — PROGRESS NOTES
Refused am labs d/t no butterflies available. Attempted, but pt did not like straight needle.        Electronically signed by Susie Burrell RN on 11/22/2021 at 6:55 PM

## 2021-11-22 NOTE — PATIENT CARE CONFERENCE
P Quality Flow/Interdisciplinary Rounds Progress Note        Quality Flow Rounds held on November 22, 2021    Disciplines Attending:  Bedside Nurse, ,  and Nursing Unit Rafa Michel was admitted on 11/20/2021  1:50 PM    Anticipated Discharge Date:  Expected Discharge Date: 11/22/21    Disposition:    Piotr Score:  Piotr Scale Score: 21    Readmission Risk              Risk of Unplanned Readmission:  8           Discussed patient goal for the day, patient clinical progression, and barriers to discharge.   The following Goal(s) of the Day/Commitment(s) have been identified:  Diagnostics - Report Results      Siva Magana RN  November 22, 2021

## 2021-11-22 NOTE — PROGRESS NOTES
GENERAL SURGERY  DAILY PROGRESS NOTE  11/22/2021  Chief Complaint   Patient presents with    Abdominal Pain     mid onset yesterday, reports hx of multiple hernia repairs in that location Pain radiates straight thru to back    Constipation     last normal BM 2 days, able to pass gas       Subjective:  Patient resting comfortably in his bed. He endorses passing gas, but has not had a bowel movement. He had 400 cc bilious out of NG tube overnight. Objective:  /66   Pulse 71   Temp 98.1 °F (36.7 °C) (Oral)   Resp 18   Ht 6' (1.829 m)   Wt 253 lb (114.8 kg)   SpO2 93%   BMI 34.31 kg/m²     General appearance: alert, cooperative and in no acute distress. Eyes: grossly normal  Lungs: nonlabored breathing on room air  Heart: regular rate, normotensive  Abdomen: soft, non-tender, mildly distended. NG tube in place. Skin: No skin abnormalities  Neurologic: Alert and oriented x 3. Grossly normal  Musculoskeletal: No clubbing cyanosis or edema    Assessment/Plan:  61 y.o. male history of multiple abdominal surgeries and likely small bowel obstruction. NPO  NG  SBFT today, 11/22    Patient findings and plan discussed with Dr. Patricia Winkler. Electronically signed by Jeff Gaona MD on 11/22/2021 at 8:22 AM     Attending Physician Statement:    Chief Complaint:   Chief Complaint   Patient presents with    Abdominal Pain     mid onset yesterday, reports hx of multiple hernia repairs in that location Pain radiates straight thru to back    Constipation     last normal BM 2 days, able to pass gas       I have examined the patient and performed the key aspects of physical exam, reviewed the record (including all pertinent and new radiology images and laboratory findings), and discussed the case with the surgical team.  I agree with the assessment and plan with the following additions, corrections, and changes. 14pt review of symptoms completed and negative except as mentioned.     Small bowel series imaging shows contrast in the colon. He has had multiple bowel movements. Feels much better. No nausea or vomiting. No significant distention on his imaging either. Remove NG tube and start clears. Kayla Estes MD  11/22/21  10:20 AM    NOTE: This report, in part or full, may have been transcribed using voice recognition software. Every effort was made to ensure accuracy; however, inadvertent computerized transcription errors may be present. Please excuse any transcriptional grammatical or spelling errors that may have escaped my editorial review.

## 2021-11-23 VITALS
SYSTOLIC BLOOD PRESSURE: 118 MMHG | TEMPERATURE: 97.7 F | OXYGEN SATURATION: 92 % | DIASTOLIC BLOOD PRESSURE: 78 MMHG | HEART RATE: 67 BPM | WEIGHT: 253 LBS | BODY MASS INDEX: 34.27 KG/M2 | HEIGHT: 72 IN | RESPIRATION RATE: 16 BRPM

## 2021-11-23 LAB
METER GLUCOSE: 144 MG/DL (ref 74–99)
METER GLUCOSE: 145 MG/DL (ref 74–99)
METER GLUCOSE: 193 MG/DL (ref 74–99)

## 2021-11-23 PROCEDURE — 6370000000 HC RX 637 (ALT 250 FOR IP): Performed by: SURGERY

## 2021-11-23 PROCEDURE — 2500000003 HC RX 250 WO HCPCS: Performed by: SURGERY

## 2021-11-23 PROCEDURE — 2580000003 HC RX 258: Performed by: SURGERY

## 2021-11-23 PROCEDURE — 99232 SBSQ HOSP IP/OBS MODERATE 35: CPT | Performed by: SURGERY

## 2021-11-23 PROCEDURE — 82962 GLUCOSE BLOOD TEST: CPT

## 2021-11-23 RX ADMIN — FAMOTIDINE 20 MG: 10 INJECTION, SOLUTION INTRAVENOUS at 07:41

## 2021-11-23 RX ADMIN — INSULIN LISPRO 1 UNITS: 100 INJECTION, SOLUTION INTRAVENOUS; SUBCUTANEOUS at 10:58

## 2021-11-23 RX ADMIN — SODIUM CHLORIDE, PRESERVATIVE FREE 10 ML: 5 INJECTION INTRAVENOUS at 07:42

## 2021-11-23 RX ADMIN — SODIUM CHLORIDE, POTASSIUM CHLORIDE, SODIUM LACTATE AND CALCIUM CHLORIDE: 600; 310; 30; 20 INJECTION, SOLUTION INTRAVENOUS at 03:17

## 2021-11-23 RX ADMIN — INSULIN LISPRO 1 UNITS: 100 INJECTION, SOLUTION INTRAVENOUS; SUBCUTANEOUS at 03:18

## 2021-11-23 RX ADMIN — INSULIN LISPRO 1 UNITS: 100 INJECTION, SOLUTION INTRAVENOUS; SUBCUTANEOUS at 07:06

## 2021-11-23 NOTE — PROGRESS NOTES
[facility] Quality Flow/Interdisciplinary Rounds Progress Note        Quality Flow Rounds held on November 23, 2021    Disciplines Attending:  Bedside Nurse, ,  and Nursing Unit Rafa Michel was admitted on 11/20/2021  1:50 PM    Anticipated Discharge Date:  Expected Discharge Date: 11/22/21    Disposition:    Piotr Score:  Piotr Scale Score: 22    Readmission Risk              Risk of Unplanned Readmission:  9           Discussed patient goal for the day, patient clinical progression, and barriers to discharge.   The following Goal(s) of the Day/Commitment(s) have been identified:  Diet Progression  possible discharge if tolerates      Sahil Hansen RN  November 23, 2021

## 2021-11-23 NOTE — PROGRESS NOTES
GENERAL SURGERY  DAILY PROGRESS NOTE  11/23/2021  Chief Complaint   Patient presents with    Abdominal Pain     mid onset yesterday, reports hx of multiple hernia repairs in that location Pain radiates straight thru to back    Constipation     last normal BM 2 days, able to pass gas       Subjective:  No acute events. Patient tolerating clear liquid diet without nausea and vomiting. He endorses passing gas and having bowel movements. SBFT yesterday demonstrated contrast in the colon. Objective:  /78   Pulse 67   Temp 97.7 °F (36.5 °C) (Oral)   Resp 16   Ht 6' (1.829 m)   Wt 253 lb (114.8 kg)   SpO2 92%   BMI 34.31 kg/m²     General appearance: alert, cooperative and in no acute distress. Eyes: grossly normal  Lungs: nonlabored breathing on room air  Heart: regular rate, normotensive  Abdomen: soft, non-tender, mildly distended. Skin: No skin abnormalities  Neurologic: Alert and oriented x 3. Grossly normal  Musculoskeletal: No clubbing cyanosis or edema    Assessment/Plan:  61 y.o. male history of multiple abdominal surgeries and likely small bowel obstruction. Okay for low fiber diet. If patient tolerates, can discharge later today. Patient findings and plan discussed with Dr. Yamileth Mir. Electronically signed by Leonel Pugh MD on 11/23/2021 at 9:29 AM     Attending Physician Statement:    Chief Complaint:   Chief Complaint   Patient presents with    Abdominal Pain     mid onset yesterday, reports hx of multiple hernia repairs in that location Pain radiates straight thru to back    Constipation     last normal BM 2 days, able to pass gas       I have examined the patient and performed the key aspects of physical exam, reviewed the record (including all pertinent and new radiology images and laboratory findings), and discussed the case with the surgical team.  I agree with the assessment and plan with the following additions, corrections, and changes.  14pt review of symptoms completed and negative except as mentioned. Feeling much better. Having multiple bowel movements. Tolerating solid food. Okay for home today. Karis Ireland MD  11/23/21  12:24 PM    NOTE: This report, in part or full, may have been transcribed using voice recognition software. Every effort was made to ensure accuracy; however, inadvertent computerized transcription errors may be present. Please excuse any transcriptional grammatical or spelling errors that may have escaped my editorial review.

## 2021-11-23 NOTE — DISCHARGE SUMMARY
Physician Discharge Summary     Patient ID:  Janna Lanes  [id number]  61 y.o.  1965    Admit date: 11/20/2021    Discharge date and time: No discharge date for patient encounter. Admitting Physician: Melania Espinosa MD     Admission Diagnoses: Small bowel obstruction ([facility] [K56.609]  SBO (small bowel obstruction) ([facility] [K56.609]  Constipation, unspecified constipation type [K59.00]    Discharge Diagnoses: Active Problems:    SBO (small bowel obstruction) ([facility])  Resolved Problems:    * No resolved hospital problems. *      Admission Condition: poor    Discharged Condition: stable      Hospital Course:  Janna Lanes is a 61 y.o. male who presented with abdominal pain. Work up revealed small bowel obstruction. Small bowel series revealed contrast in the colon. The patient's course was otherwise uneventful. He progressed well, pain was controlled on PO medications. He was tolerating a regular diet with no nausea or vomiting, and was in a suitable condition for discharge to home in stable condition. Consults:   IP CONSULT TO PRIMARY CARE PROVIDER    Significant Diagnostic Studies:   CT ABDOMEN PELVIS W IV CONTRAST Additional Contrast? None    Result Date: 11/20/2021  EXAMINATION: CT OF THE ABDOMEN AND PELVIS WITH CONTRAST 11/20/2021 5:45 pm TECHNIQUE: CT of the abdomen and pelvis was performed with the administration of intravenous contrast. Multiplanar reformatted images are provided for review. Dose modulation, iterative reconstruction, and/or weight based adjustment of the mA/kV was utilized to reduce the radiation dose to as low as reasonably achievable. COMPARISON: None.  HISTORY: ORDERING SYSTEM PROVIDED HISTORY: pain TECHNOLOGIST PROVIDED HISTORY: Reason for exam:->pain Additional Contrast?->None Decision Support Exception - unselect if not a suspected or confirmed emergency medical condition->Emergency Medical Condition (MA) FINDINGS: Lower Chest: There are multifocal ground-glass patchy opacities in the periphery of the visualized lower lungs. No pleural effusion. Organs: Fatty liver with no focal lesions. Gallbladder is surgically absent. Spleen is not enlarged. Pancreas and adrenal glands appear unremarkable. There is symmetric enhancement of the kidneys with no hydronephrosis or perinephric infiltration. GI/Bowel: Appendix is normal.  There are mildly dilated and fluid-filled loops of proximal and mid small bowel with air-fluid levels. Pelvis: Bladder is suboptimally distended. There is no significant free fluid. Peritoneum/Retroperitoneum: No free air or significant adenopathy. Bones/Soft Tissues: Unremarkable. There are mildly dilated and fluid-filled loops of proximal and mid small bowel with multiple air-fluid levels, suspicious for early or partial small bowel obstruction. Diffuse fatty liver. XR ABDOMEN FOR NG/OG/NE TUBE PLACEMENT    Result Date: 11/20/2021  EXAMINATION: ONE SUPINE XRAY VIEW(S) OF THE ABDOMEN 11/20/2021 7:09 pm COMPARISON: None. HISTORY: ORDERING SYSTEM PROVIDED HISTORY: Confirmation of course of NG/OG/NE tube and location of tip of tube TECHNOLOGIST PROVIDED HISTORY: Reason for exam:->Confirmation of course of NG/OG/NE tube and location of tip of tube Portable? ->Yes FINDINGS: Nasogastric tube courses below the level of the diaphragm with distal tip in the expected location of the stomach. Satisfactory position of nasogastric tube. Discharge Exam:  /78   Pulse 67   Temp 97.7 °F (36.5 °C) (Oral)   Resp 16   Ht 6' (1.829 m)   Wt 253 lb (114.8 kg)   SpO2 92%   BMI 34.31 kg/m²      General appearance: alert, cooperative and in no acute distress. Eyes: grossly normal  Lungs: nonlabored breathing on room air  Heart: regular rate, normotensive  Abdomen: soft, non-tender, mildly distended. Skin: No skin abnormalities  Neurologic: Alert and oriented x 3.  Grossly normal  Musculoskeletal: No clubbing cyanosis or edema    Disposition: home    In process/preliminary results:  Outstanding Order Results     No orders found from 10/22/2021 to 11/21/2021. Patient Instructions:   Current Discharge Medication List      CONTINUE these medications which have NOT CHANGED    Details   glipiZIDE (GLUCOTROL) 10 MG tablet Take 10 mg by mouth daily      aspirin 81 MG chewable tablet Take 81 mg by mouth daily      lovastatin (MEVACOR) 20 MG tablet Take 20 mg by mouth nightly      metFORMIN (GLUCOPHAGE) 1000 MG tablet Take 1,000 mg by mouth 2 times daily (with meals)      lisinopril-hydroCHLOROthiazide (PRINZIDE;ZESTORETIC) 10-12.5 MG per tablet Take 1 tablet by mouth daily                Bowel Blockage (Intestinal Obstruction): Care Instructions  Your Care Instructions  A bowel blockage, also called an intestinal obstruction, can prevent gas, fluids, or solids from moving through the intestines normally. It can cause constipation and, rarely, diarrhea. You may have pain, nausea, vomiting, and cramping. Most of the time, complete blockages require a stay in the hospital and possibly surgery. But if your bowel is only partly blocked, your doctor may tell you to wait until it clears on its own and you are able to pass gas and stool. If so, there are things you can do at home to help make you feel better. If you have had surgery for a bowel blockage, there are things you can do at home to make sure you heal well. You can also make some changes to keep your bowel from becoming blocked again. Follow-up care is a key part of your treatment and safety. Be sure to make and go to all appointments, and call your doctor if you are having problems. It's also a good idea to know your test results and keep a list of the medicines you take. How can you care for yourself at home? If your doctor has told you to wait at home for a blockage to clear on its own:  · Follow your doctor's instructions. These may include eating a liquid diet to avoid complete blockage.   · Be safe with medicines. Take your medicines exactly as prescribed. Call your doctor if you think you are having a problem with your medicine. · Put a heating pad set on low on your belly to relieve mild cramps and pain. To prevent another blockage  · Try to eat smaller amounts of food more often. For example, have 5 or 6 small meals throughout the day instead of 2 or 3 large meals. · Chew your food very well. Try to chew each bite about 20 times or until it is liquid. · Avoid high-fiber foods and raw fruits and vegetables with skins, husks, strings, or seeds. These can form a ball of undigested material that can cause a blockage if a part of your bowel is scarred or narrowed. · Check with your doctor before you eat whole-grain products or use a fiber supplement such as Citrucel or Metamucil. · To help you have regular bowel movements, eat at regular times, do not strain during a bowel movement, and drink plenty of water. If you have kidney, heart, or liver disease and have to limit fluids, talk with your doctor before you increase the amount of fluids you drink. · Drink high-calorie liquid formulas if your doctor says to. Severe symptoms may make it hard for your body to take in vitamins and minerals. · Get regular exercise. It helps you digest your food better. Get at least 30 minutes of physical activity on most days of the week. Walking is a good choice. When should you call for help? Call your doctor now or seek immediate medical care if:    · You have a fever.     · You are vomiting.     · You have new or worse belly pain.     · You cannot pass stools or gas. Watch closely for changes in your health, and be sure to contact your doctor if you have any problems. Where can you learn more? Go to [email or web address] org and sign in to your [employer] account. Enter S898 in the [facility] box to learn more about \"Bowel Blockage (Intestinal Obstruction): Care Instructions. \"     If you do not have an account, please click on the \"Sign Up Now\" link. Current as of: February 10, 2021               Content Version: 13.0  © 2006-2021 [employer]. Care instructions adapted under license by [facility] ([facility]). If you have questions about a medical condition or this instruction, always ask your healthcare professional. [place] [address] any warranty or liability for your use of this information.        Follow up:   Lobo Turcios MD  [address] [place] [address] Dr. [facility]  [phone]    Schedule an appointment as soon as possible for a visit in 2 weeks  SBO follow-up       Signed:  Jazmine Adrian MD  11/23/2021  12:46 PM

## 2023-11-11 NOTE — PROGRESS NOTES
PCDs in patient room.  Patient ambulating freely  Electronically signed by Nneka Funes RN on 11/23/2021 at 4:54 AM Visit Summary has been reviewed with me, the patient and/or responsible adult, by my health care provider(s). I had the opportunity to ask questions regarding this information.  I have elected to receive;      []After Visit Summary  [x]Comprehensive Discharge Instruction        Patient signature______________________________________Date:________  Electronically signed by Mercedes Jackson MD on 11/13/2023 at 1:18 PM  Electronically signed by Nahun Benoit RN on 11/13/2023 at 4:27 PM